# Patient Record
Sex: FEMALE | Race: WHITE | NOT HISPANIC OR LATINO | Employment: OTHER | URBAN - METROPOLITAN AREA
[De-identification: names, ages, dates, MRNs, and addresses within clinical notes are randomized per-mention and may not be internally consistent; named-entity substitution may affect disease eponyms.]

---

## 2017-02-22 ENCOUNTER — ALLSCRIPTS OFFICE VISIT (OUTPATIENT)
Dept: OTHER | Facility: OTHER | Age: 67
End: 2017-02-22

## 2017-02-22 ENCOUNTER — GENERIC CONVERSION - ENCOUNTER (OUTPATIENT)
Dept: OTHER | Facility: OTHER | Age: 67
End: 2017-02-22

## 2017-02-24 ENCOUNTER — GENERIC CONVERSION - ENCOUNTER (OUTPATIENT)
Dept: OTHER | Facility: OTHER | Age: 67
End: 2017-02-24

## 2017-03-30 ENCOUNTER — ALLSCRIPTS OFFICE VISIT (OUTPATIENT)
Dept: OTHER | Facility: OTHER | Age: 67
End: 2017-03-30

## 2017-04-20 ENCOUNTER — ALLSCRIPTS OFFICE VISIT (OUTPATIENT)
Dept: OTHER | Facility: OTHER | Age: 67
End: 2017-04-20

## 2017-04-20 DIAGNOSIS — F31.9 BIPOLAR DISORDER (HCC): ICD-10-CM

## 2017-05-12 ENCOUNTER — ALLSCRIPTS OFFICE VISIT (OUTPATIENT)
Dept: OTHER | Facility: OTHER | Age: 67
End: 2017-05-12

## 2017-05-12 ENCOUNTER — GENERIC CONVERSION - ENCOUNTER (OUTPATIENT)
Dept: OTHER | Facility: OTHER | Age: 67
End: 2017-05-12

## 2017-06-01 ENCOUNTER — ALLSCRIPTS OFFICE VISIT (OUTPATIENT)
Dept: OTHER | Facility: OTHER | Age: 67
End: 2017-06-01

## 2017-06-08 ENCOUNTER — ALLSCRIPTS OFFICE VISIT (OUTPATIENT)
Dept: OTHER | Facility: OTHER | Age: 67
End: 2017-06-08

## 2017-07-05 ENCOUNTER — ALLSCRIPTS OFFICE VISIT (OUTPATIENT)
Dept: OTHER | Facility: OTHER | Age: 67
End: 2017-07-05

## 2017-07-06 ENCOUNTER — ALLSCRIPTS OFFICE VISIT (OUTPATIENT)
Dept: OTHER | Facility: OTHER | Age: 67
End: 2017-07-06

## 2017-08-03 ENCOUNTER — ALLSCRIPTS OFFICE VISIT (OUTPATIENT)
Dept: OTHER | Facility: OTHER | Age: 67
End: 2017-08-03

## 2017-08-15 ENCOUNTER — GENERIC CONVERSION - ENCOUNTER (OUTPATIENT)
Dept: OTHER | Facility: OTHER | Age: 67
End: 2017-08-15

## 2017-08-29 ENCOUNTER — ALLSCRIPTS OFFICE VISIT (OUTPATIENT)
Dept: OTHER | Facility: OTHER | Age: 67
End: 2017-08-29

## 2017-09-07 ENCOUNTER — ALLSCRIPTS OFFICE VISIT (OUTPATIENT)
Dept: OTHER | Facility: OTHER | Age: 67
End: 2017-09-07

## 2017-09-12 ENCOUNTER — GENERIC CONVERSION - ENCOUNTER (OUTPATIENT)
Dept: OTHER | Facility: OTHER | Age: 67
End: 2017-09-12

## 2017-09-21 ENCOUNTER — GENERIC CONVERSION - ENCOUNTER (OUTPATIENT)
Dept: OTHER | Facility: OTHER | Age: 67
End: 2017-09-21

## 2017-10-05 ENCOUNTER — ALLSCRIPTS OFFICE VISIT (OUTPATIENT)
Dept: OTHER | Facility: OTHER | Age: 67
End: 2017-10-05

## 2018-01-09 NOTE — PSYCH
Provider Comments  Provider Comments:   Pt Kaden Mackay) was NO SHOW for her 3:15pm Psychotherapy lor't, so this Therapist called her---> I left her a Phone Message to please call us back to reschedule,and that I hope she is OK  -MT      Signatures   Electronically signed by : Kristen Garcia, MSAPRNPMHCNS-BC; Aug 15 2017  3:45PM EST                       (Author)

## 2018-01-10 ENCOUNTER — ALLSCRIPTS OFFICE VISIT (OUTPATIENT)
Dept: OTHER | Facility: OTHER | Age: 68
End: 2018-01-10
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
## 2018-01-10 NOTE — PSYCH
Progress Note  Psychotherapy Provided [facility]: Individual Psychotherapy 50 minutes provided today  Goals addressed in session:   Goal #1  D: " My Pain is constant, it's an 8 today ---But, I did get 3 Epidural injections this morning, into my Lower Back---I hope to get SOME decrease in Pain---Especially because my  and I are going to do a trip of a lifetime, to [employer], at the end of October, as my  is battling Stage 4 Cancer,and he feels good enough to go as soon as we can"   " I hope my Pain is tolerable, by then "   " We DID accept my daughter Edwige Bryant, (45) being discharged from [facility] AT THE [facility] Psych  unit , back to our home  Akash Gomez But I don't know how it will turn out,yet"    Akash Gomez "She did go with us on vacation to [place], [place], along with her brother, and [employer] been helping us a little, at home, since her hospital care "---"She got a new [facility] Psychiatrist,and a new Therapist, and I like them ,so far"    Akash Gomez "The doctor told her, outright, that he wasn't going to take any of her crap "    Pt Camryn Alba) has a slightly brighter mood and affect, after having been on a 4-day vacation to the beach at [place], [place], with her family  Pt denies any current SI /HI /AH/VH  Her sleep is fair,and her appetite is good  She continues meds as prescribed by her Psychiatrist, no adverse side effects  Pt processes her thoughts, feelings,and behaviors  Pt and  hesitantly accepted back home , their 45 yr old daughter, Edwige Bryant, who has Bipolar, Brain Tumor,and Seizures, & alleged Personality Disorder, after the daughter's Psych  Hospitalization  Delia Mccallum processes her thoughts, feelings,and behaviors  She is assisted with Cognitive-reframing about her strengths and how far she has come in life,so far, despite her own severe Bipolar Disorder and her Pain and multiple Medical conditions   She has been supportive of  during his Cancer treatments,and has been supportive of her daughter's treatments for Bipolar, Brain tumor,and for Seizures  Pt to do one goal per day,and give self-affirmations  She is more assertive with her daughter,and with her  Ana Harmon he is too perfectionistic")  Pt also does Deep-Breathing/ Relaxation, reads her Cindy Hull, and goes outside on her deck or her porch, when she needs a "time-out " She and  attend Family Sessions at daughter's [address] 76 care at [facility]--->pt hopes that daughter's behavior does NOT get violent or out of control again  Pt looks forward to a unique Vacation, with her , to [place] at the end of October   she has longed for this, for a very long time,and 's Cancer, plus her own medical care, plus treatments of daughter have prevented the trip in prior months  Daughter will allegedly stay at her boyfriend's, while pt and  are in [facility] / [place] [address]  Pt is given Active Listening,Support, and Affirmation during this session,also  A: Bipolar Disorder, F31 9; Generalized Anxiety Disorder, F41 10; Family Discord, Z63 9,and Multiple Medical Conditions and Chronic Pain  P: Continue Treatment Plan, Meds,and Psychotherapy  Pain Scale and Suicide Risk [facility]: Current Pain Assessment: moderate to severe   On a scale of 0 to 10, the patient rates current pain at 8   Current suicide risk is low   Behavioral Health Treatment Plan [employer]: Diagnosis and Treatment Plan explained to patient, patient relates understanding diagnosis and is agreeable to Treatment Plan  Assessment    1  Bipolar affective disorder, current episode severe (296 80) (F31 9)   2  ORION (generalized anxiety disorder) (300 02) (F41 1)   3  Family discord (V61 9) (Z63 9)   4   Chronic pain (338 29) (G89 29)    Signatures   Electronically signed by : FRANSISCO Alejandra; Aug 29 2017  8:12PM EST                       (Author)    Electronically signed by : FRANSISCO Alejandra; Aug 29 2017  8:13PM EST                       (Author)

## 2018-01-11 NOTE — PSYCH
Progress Note  Psychotherapy Provided [facility]: Individual Psychotherapy 50 minutes provided today  Goals addressed in session:   Goal #1  D: " My Pain is very bad, going down from my Left Hip and all the way down my Left Leg, that's why I can't walk well---My Pain is an 8!  " " I'm finding out from a Specialist soon, whether I need surgery or not "   " But I'm NOT having anything done, until AFTER my vacation with my  to [place] [address] on October 28---We got a Motorized Wheelchair for me there,and I am determined to enjoy it!"   " My  going through his Cancer and all, I figure life is too short to put things on-hold, at this point in my life "   " "I was very upset, seeing about those people who were killed in [facility] this Sunday night---I had to turn the News off,and try to Breathe, and do something else  '    Gurrola Ariadna "What would make a person kill people like that?"   " I'm crying because I often wonder what my OWN parents were about? I never found out a thing  [facility] I was adopted--why would they keep all 3 of my older sisters, I found out, but get rid of me?"    Pt has a variable affect,and variable mood swings  She has a moderately depressed mood today  She can be laughing one moment, and then very tearful for quite a while,afterward  She denies current SI /HI/AH/VH  She states she used to get suicidal, but she has not felt that way for quite some time , now  Her PHQ9=7, moderate to mild depression today  She processes Current Events,and her responses to them  She is emotionally -reactive to anyone's Pain and Suffering  She processes her thoughts, feelings,and behaviors  Processes Family of Origin issues, as she never found out about Mental Health or Physical health issues of her Biological parents---she was adopted-out as an infant  Pt has thought she needed her 's permission, to get info about her Birth Parents---> pt is given Psychoeducation, and some information about this   Pt may decide, after her [place] trip, to get some Health Info about her Birth Parents, (It was an Open Adoption), as her doctors always ask her about her Family History of illnesses,and she is "always crying inside that I can't tell them a thing because I don't know " Pt does express some reservation about finding out about them  She does some Cognitive -reframing, in session, about this--->"What's the worst that can happen?" Active Listening,Support,and Validation given  We look at her Survivor strengths, again---pt sees that she has many, plus she birthed 2 children, raised them, with her 's support,and she is very grateful for her 's unconditional love  Pt is assisted with Deep-breathing/Relaxation,and some Positive mantras----> pt to do this at home  Pt also expresses relief that her daughter moved out of the house, to her boyfriend's home---> there is less tension, now,and pt sees daughter often, but NOT all the time  Pt continues meds, no side effects  A: Bipolar Disorder, F31 9; Anxiety Disorder, F41 9; Family Discord re: Adult Bipolar Daughter, better, Z63 9,and pt has Diabetes and other medical conditions, including Chronic Pain  P: Continue Treatment Plan, Meds,and Psychotherapy  Pain Scale and Suicide Risk [facility]: Current Pain Assessment: moderate to severe   On a scale of 0 to 10, the patient rates current pain at 8   Current suicide risk is low   Behavioral Health Treatment Plan [facility]: Diagnosis and Treatment Plan explained to patient, patient relates understanding diagnosis and is agreeable to Treatment Plan  Results/Data  PHQ-9 Adult Depression Screening [id number] 03:15PM [facility]     Test Name Result Flag Reference   PHQ-9 Adult Depression Score 7     Over the last two weeks, how often have you been bothered by any of the following problems?   Little interest or pleasure in doing things: Not at all - 0  Feeling down, depressed, or hopeless: Not at all - 0  Trouble falling or staying asleep, or sleeping too much: More than half the days - 2  Feeling tired or having little energy: More than half the days - 2  Poor appetite or over eating: Not at all - 0  Feeling bad about yourself - or that you are a failure or have let yourself or your family down: Several days - 1  Trouble concentrating on things, such as reading the newspaper or watching television: Several days - 1  Moving or speaking so slowly that other people could have noticed  Or the opposite -  being so fidgety or restless that you have been moving around a lot more than usual: Several days - 1  Thoughts that you would be better off dead, or of hurting yourself in some way: Not at all - 0   PHQ-9 Adult Depression Screening Negative     PHQ-9 Difficulty Level Somewhat difficult     PHQ-9 Severity Mild Depression         Assessment    1  Bipolar 1 disorder (296 7) (F31 9)   2  ORION (generalized anxiety disorder) (300 02) (F41 1)   3   Family discord (V61 9) (Z63 9)    Signatures   Electronically signed by : BASHIR RendonMHCNS-BC; Oct  5 2017 10:27PM EST                       (Author)    Electronically signed by : FRANSISCO Rendon; Oct  5 2017 10:27PM EST                       (Author)

## 2018-01-11 NOTE — PSYCH
Progress Note  Psychotherapy Provided [facility]: Individual Psychotherapy 50 minutes provided today  Goals addressed in session:   Goal #1  D: " I had a scare on Tuesday---My blood sugar was up to 477, and I was feeling very jittery, weak, and 'not myself'---so my  drove me over to ER at [facility]  Akash Gomez The ER doctors even checked me for a possible stroke, because my Right side of my face droops down---But I was OK, no stroke,and they gave me IV's , and my usual Insulin  Akash Gomez I got to go home the same day "   " The ER doctors said my high blood sugar episode could have been related to Steroid treatments I get for my Back Pain, and my Right Facial drooping can be residual from my Bell's Palsy years ago, so I am relieved!"    Pt Roland Freeman) was the only pt who arrived today for Bipolar Group, (others are sick, apparently), so she agreed to have an Individual Therapy session today  She has an anxious, hyperverbal affect  Mood is just slightly depressed  She denies any SI / HI / Franki Libel / VH  She has had no hypomanic behaviors, no screaming "meltdowns" lately,and feels good about her Coping activities and Anger-Management skills lately  She gets about 5 hours Sleep per night---an improvement over the 2 or 3 hrs she used to get  Her appetite is good---she has been decreasing her portion sizes lately ,as she is intent on losing a few lbs before her [employer] [place] trip with  October 29  Pt processes her recent stress re: her sudden Blood Glucose elevation to 477 , on Tuesday ( 9/5/17)---She tried to remain calm, she asked her  to take her to the ER, she did her Calm / Deep-breathing exercises, she "didn't burst into tears" as she says she would have a couple years ago, and told herself she would be OK---> she got through the ER / diagnostics/ IV's, fine,and she has been maintaining normal Bl  sugars since then   Pt does Cognitive-reframing, in session today,and she is able to give Self-affirmations for handling the situation well  She follows her diabetic diet, drinks adequate water,and the ER doctors reassured her that the possible side effects of her recent Pain Management corticosteroid injections, should subside soon  Pt is practicing slow, calm, Deep-breathing/ Mindfulness exercises at home, she does household chores, she goes outside on her deck, enjoys [facility], and she appreciates positives in her life  She looks forward to her friends' visiting her and  Sept 30 , for a couple days,and she looks forward to her vacation with  and with her adult son, to [facility] on October 29  Her daughter, who has Bipolar Disorder and Brain tumor, has been behaving much better lately, (since hospitalization and ongoing psychotherapy,) and pt is thankful that her daughter spends the bulk of her time with her boyfriend , now  Pt continues Psych meds as prescribed by her physician  A: Bipolar Disorder, F31 9; Generalized Anxiety Disorder, F41 9; Family discord, improving, F41 9; and pt has diabetes, chronic pain,and other medical conditions  P: Continue Treatment Plan, Meds, Psychotherapy,and Positive Coping behaviors  Pain Scale and Suicide Risk [facility]: Current Pain Assessment: moderate to severe   On a scale of 0 to 10, the patient rates current pain at 5   Current suicide risk is low   Behavioral Health Treatment Plan [address] [place] [address] [address]: Diagnosis and Treatment Plan explained to patient, patient relates understanding diagnosis and is agreeable to Treatment Plan  Assessment    1  Bipolar 1 disorder (296 7) (F31 9)   2  ORION (generalized anxiety disorder) (300 02) (F41 1)   3   Family discord (V61 9) (Z63 9)    Signatures   Electronically signed by : Azra Wagoner MSAPRNPMHCNS-BC; Sep  7 2017  4:07PM EST                       (Author)
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
## 2018-01-12 NOTE — PSYCH
Psych Med Mgmt    Appearance: was calm and cooperative  Observed mood: mood appropriate  Observed mood: affect appropriate  Speech: a normal rate  Thought processes: coherent/organized  Hallucinations: no hallucinations present  Thought Content: no delusions  Abnormal Thoughts: The patient has no suicidal thoughts and no homicidal thoughts  Orientation: The patient is oriented to person, place and time  Recent and Remote Memory: short term memory intact and long term memory intact  Attention Span And Concentration: concentration intact  Insight: Insight intact  Judgment: Her judgment was intact  Treatment Recommendations: Depakote level, hepatic function pane    D/C ambien    Abilify 5 mg po qhs  Trazodone 100 mg 1/2 -1 po qhs prn  Depakote 500 mg po bid    Risks, Benefits And Possible Side Effects Of Medications: Risks, benefits, and possible side effects of medications explained to patient and patient verbalizes understanding  She reports normal appetite, normal energy level, no weight change and decrease in number of sleep hours   Pt states she is feeling better- :calmer"  She cannot sleep though- she falls asleep okay with ambien but awakens after 2-3 hours  She states she has increase worries after she awakens- "think about anything and everything"  She states her  was started in [address] and he has been much nicer and supportive  Vitals  Signs [Data Includes: Current Encounter]   Recorded: 14KFN3847 03:30PM   Height: 5 ft 4 in  Weight: 189 lb   BMI Calculated: 32 44  BSA Calculated: 1 91    DSM    Provisional Diagnosis: Bipolar Disorder  Assessment    1  Bipolar affective disorder, current episode severe (296 80) (F31 9)    Plan    1  Zolpidem Tartrate 5 MG Oral Tablet (Ambien)   2  TraZODone HCl - 100 MG Oral Tablet; 1/2 - 1 po qhs   3  ARIPiprazole 5 MG Oral Tablet (Abilify); 1 tab po qhs   4   Divalproex Sodium 500 MG Oral Tablet Delayed Release (Depakote); 1 tab po   bid   5  (1) HEPATIC FUNCTION PANEL; Status:Active; Requested for:22Jan2016;    6  (1) VALPROIC ACID (DEPAKOTE); Status:Active; Requested for:22Jan2016;     Review of Systems    Constitutional: No fever, no chills, feels well, no tiredness, no recent weight gain or loss  Cardiovascular: no complaints of slow or fast heart rate, no chest pain, no palpitations  Respiratory: no complaints of shortness of breath, no wheezing, no dyspnea on exertion  Gastrointestinal: no complaints of abdominal pain, no constipation, no nausea, no diarrhea, no vomiting  Genitourinary: no complaints of dysuria, no incontinence, no pelvic pain, no urinary frequency  Musculoskeletal: back pain  Active Problems    1  Bipolar affective disorder, current episode severe (296 80) (F31 9)    Allergies    1  Percocet TABS   2  Sulfa Drugs    Current Meds   1  ARIPiprazole 5 MG Oral Tablet; 1 tab po qhs;   Therapy: 97HSI2344 to (Last Rx:09Nov2015)  Requested for: 98RLK0105 Ordered   2  Divalproex Sodium 500 MG Oral Tablet Delayed Release; 1 tab po bid  Requested for:   23OOX2145; Last Rx:09Nov2015 Ordered   3  Lantus SOLN;   Therapy: (Recorded:09Nov2015) to Recorded   4  MetFORMIN HCl TABS; Therapy: ([phone]) to Recorded   5  Robaxin-750 TABS; Therapy: (679 738 973) to Recorded   6  Vicodin TABS; Therapy: (679 738 973) to Recorded   7  Zolpidem Tartrate 5 MG Oral Tablet; take 1 tablet at bedtime as needed; Therapy: 72NLJ4513 to (Last MQ:26REC8000) Ordered    The medication list was reviewed and updated today  Family Psych History    1  No pertinent family history    2  Adopted (D72 92) (Z02 82)    The family history was reviewed and updated today  Social History    · Never a smoker   · Social alcohol use (F10 99)  The social history was reviewed and updated today  The social history was reviewed and is unchanged  End of Encounter Meds    1   ARIPiprazole 5 MG Oral Tablet (Abilify); 1 tab po qhs;   Therapy: 45RNC2736 to (Last Rx:22Jan2016)  Requested for: 77XRV2037 Ordered   2  Divalproex Sodium 500 MG Oral Tablet Delayed Release (Depakote); 1 tab po bid    Requested for: 10CXO3116; Last Rx:22Jan2016 Ordered   3  TraZODone HCl - 100 MG Oral Tablet; 1/2 - 1 po qhs;   Therapy: 80HWE5791 to (Last Rx:22Jan2016) Ordered    4  Lantus SOLN;   Therapy: (Recorded:09Nov2015) to Recorded   5  MetFORMIN HCl TABS; Therapy: ([phone]) to Recorded   6  Robaxin-750 TABS (Methocarbamol); Therapy: (669 638 953) to Recorded   7  Vicodin TABS;    Therapy: (526 244 953) to Recorded    Signatures   Electronically signed by : Mariama Harvey, [facility]; Jan 22 2016  3:36PM EST                       (Author)    Electronically signed by : Genesis Herbert MD; Jan 26 2016  4:30PM EST

## 2018-01-13 NOTE — PSYCH
Psych Med Mgmt    Appearance: was calm and cooperative, adequate hygiene and grooming and good eye contact  Observed mood: anxious  Speech:  s/w rambling  Thought processes: coherent/organized  Hallucinations: no hallucinations present  Thought Content: no delusions  Abnormal Thoughts: The patient has no suicidal thoughts and no homicidal thoughts  Orientation: The patient is oriented to person, place and time  Recent and Remote Memory: short term memory intact and long term memory intact  Attention Span And Concentration: concentration intact  Insight: Insight intact  Judgment: Her judgment was intact  Muscle Strength And Tone  Normal gait and station  Treatment Recommendations: Trazodone 100 1 and 1/2 po qhs  Abilify 10 mg po qhs  Neurontin 100 mg po bid and increase to 400 mg po qhs  Change Depakote to ER 1500 mg po qhs- get lab work    Tremor has been ongoing prior to Abilify and on left side only- question etiology    She is seeing Fran Quintanilla for therapy and this is going well  Risks, Benefits And Possible Side Effects Of Medications: Risks, benefits, and possible side effects of medications explained to patient and patient verbalizes understanding  She reports normal appetite, normal energy level, no weight change and decrease in number of sleep hours   Pt seen for follow up accompanied by her spouse  Pt state she feels "shaky on the inside"  She complains of racing thoughts- she states "things pop in her mind and keep on going"  She complains of decreased sleep due to this  She does not some "shakiness" on the left side which has been ongoing issues  She       Vitals  Signs   Recorded: 62JVM8529 02:20PM   Weight: 184 lb   BMI Calculated: 31 58  BSA Calculated: 1 89  Recorded: 61TOR1305 02:11PM   Respiration: 16  Height: 5 ft 4 in    DSM    Provisional Diagnosis: Bipolar Disorder  Assessment    1   Bipolar affective disorder, current episode severe (296 80) (F31 9)   2  Anxiety disorder (300 00) (F41 9)    Plan    1  Divalproex Sodium 500 MG Oral Tablet Delayed Release (Depakote)   2  Divalproex Sodium  MG Oral Tablet Extended Release 24 Hour (Depakote   ER); take 3 tablets by mouth at bedtime   3  ARIPiprazole 10 MG Oral Tablet; 1 po qhs   4  Gabapentin 100 MG Oral Capsule; 1 cap po bid and 4 po qhs   5  (1) VALPROIC ACID (DEPAKOTE); Status:Active; Requested for:47Onc6139;     Review of Systems    Constitutional: No fever, no chills, feels well, no tiredness, no recent weight gain or loss  Cardiovascular: no complaints of slow or fast heart rate, no chest pain, no palpitations  Active Problems    1  Anticipatory grieving (309 0) (F43 20)   2  Anxiety disorder (300 00) (F41 9)   3  Bipolar affective disorder, current episode severe (296 80) (F31 9)   4  Chronic pain (338 29) (G89 29)   5  Marital conflict (M35 17) (I05 4)    Allergies    1  Percocet TABS   2  Sulfa Drugs    Current Meds   1  ARIPiprazole 10 MG Oral Tablet; 1 po qhs;   Therapy: 89ENO7896 to (Last Rx:63Foz8392)  Requested for: 78Cpq6413 Ordered   2  Cozaar 50 MG Oral Tablet; Therapy: (Recorded:02Dfq6437) to Recorded   3  Divalproex Sodium 500 MG Oral Tablet Delayed Release; 1 tab po qam and 2 po qhs    Requested for: 61UUD2925; Last Rx:22Mar2017 Ordered   4  Gabapentin 100 MG Oral Capsule; 1 cap po bid and 2 po qhs;   Therapy: 20Apr2017 to (Last Rx:20Apr2017)  Requested for: 20Apr2017 Ordered   5  Lantus SOLN;   Therapy: (Recorded:09Nov2015) to Recorded   6  MetFORMIN HCl TABS; Therapy: (0472 51 11 42) to Recorded   7  TraZODone HCl - 100 MG Oral Tablet; 1 and 1/2 po qhs;   Therapy: 27Apr2016 to (Last Rx:20Apr2017)  Requested for: 20Apr2017 Ordered    The medication list was reviewed and updated today  Family Psych History  Mother    1  No pertinent family history  Family History    2  Adopted (S25 21) (Z02 82)    The family history was reviewed and updated today         Social History    · Marital conflict (K39 00) (E34 5)   · Never a smoker   · Social alcohol use (Z78 9)  The social history was reviewed and is unchanged  End of Encounter Meds    1  ARIPiprazole 10 MG Oral Tablet; 1 po qhs;   Therapy: 70GYP7636 to (Last Rx:29Yyk8460)  Requested for: 16DRJ0602; Status: ACTIVE -   Transmit to Pharmacy - Awaiting Verification Ordered   2  Divalproex Sodium  MG Oral Tablet Extended Release 24 Hour (Depakote ER);   take 3 tablets by mouth at bedtime; Therapy: 21MJZ1887 to (Last Rx:33Qcb2955)  Requested for: 61XOP5160; Status: ACTIVE -   Transmit to Pharmacy - Awaiting Verification Ordered   3  Gabapentin 100 MG Oral Capsule; 1 cap po bid and 4 po qhs;   Therapy: 11Kha0503 to (Last Rx:87Eup0027)  Requested for: 94VQY3842; Status: ACTIVE -   Transmit to Pharmacy - Awaiting Verification Ordered   4  TraZODone HCl - 100 MG Oral Tablet; 1 and 1/2 po qhs;   Therapy: 72Qca9265 to (Last Rx:09Tye0617)  Requested for: 81Ozd6341 Ordered    5  Cozaar 50 MG Oral Tablet (Losartan Potassium); Therapy: (Recorded:75Usd1833) to Recorded   6  Lantus SOLN;   Therapy: (Recorded:61Guj0685) to Recorded   7  MetFORMIN HCl TABS;    Therapy: (Recorded:09Nov2015) to Recorded    Future Appointments    Date/Time Provider Specialty Site   07/06/2017 02:15 PM Azul Villanueva MS, APRN, PMHCNS_BS  [facility]   07/20/2017 02:15 PM Jeremiah Lopez MS, APRN, [facility]  [facility]   Electronically signed by : Precious Henriquez, [facility]; Jul 5 2017  2:36PM EST                       (Author)    Electronically signed by : Murali Sanchez MD; Jul 5 2017  4:54PM EST

## 2018-01-13 NOTE — PSYCH
Provider Comments  Provider Comments:   NO SHOW for her 3:15pm Psychotherapy lor't, so this Therapist called pt and asked her (left a voice message), to please reschedule soon  -MT      Signatures   Electronically signed by : BASHIR CarcamoMHCNSAVANAH; Sep 12 2017  3:41PM EST                       (Author)

## 2018-01-13 NOTE — PSYCH
Psych Med Mgmt    Appearance: was calm and cooperative  Observed mood: mood appropriate  Observed mood: affect appropriate  Speech: a normal rate  Thought processes: coherent/organized  Hallucinations: no hallucinations present  Thought Content: no delusions  Abnormal Thoughts: The patient has no suicidal thoughts and no homicidal thoughts  Orientation: The patient is oriented to person, place and time  Recent and Remote Memory: short term memory intact and long term memory intact  Attention Span And Concentration: concentration intact  Insight: Insight intact  Judgment: Her judgment was intact  Treatment Recommendations: Abilify 5 mgpo qhs  Hydoxyzine 50 mg po q 8 hours prn anxiety  Depakote 500 mgpo bid  Check Depakote level  Risks, Benefits And Possible Side Effects Of Medications: Risks, benefits, and possible side effects of medications explained to patient and patient verbalizes understanding  She reports normal appetite, normal energy level, no weight change and decrease in number of sleep hours   Pt seen for follow up Bipolar DIsorder  She had back surgery in June and this is feeling better- occasional back spasms  She unfortunately now has hip pain and had bone scan done to rule out osteoporosis  She states difficult with sleep due to hip pain  She states she is sleeping a little better with 1 and 1/2 trazodone which has been helping  She is going to physical therapy for hips/ back  She states she has been having episodes a couple times a week where she feels like she a "weight on her chest"- lasts about 15 minutes and has been associated with anxiety  Vitals  Signs   Recorded: 69HRY7667 65:38PR   Systolic: 137  Diastolic: 64  Heart Rate: 64  Respiration: 16  Recorded: 19KOC7871 04:19PM   Height: 5 ft 4 in  Weight: 176 lb   BMI Calculated: 30 21  BSA Calculated: 1 85    DSM    Provisional Diagnosis: Bipolar Disorder  Assessment    1   Bipolar affective disorder, current episode severe (296 80) (F31 9)    Plan    1  HydrOXYzine HCl - 50 MG Oral Tablet; 1 po q 8 hours anxiety   2  ARIPiprazole 5 MG Oral Tablet (Abilify); 1 po qhs   3  Divalproex Sodium 500 MG Oral Tablet Delayed Release (Depakote); 1 tab po   bid   4  TraZODone HCl - 100 MG Oral Tablet; 1 and 1/2 po qhs   5  (Q) VALPROIC ACID; Status:Active; Requested for:79Eih2913;     Review of Systems    Constitutional: No fever, no chills, feels well, no tiredness, no recent weight gain or loss  Cardiovascular: no complaints of slow or fast heart rate, no chest pain, no palpitations  Respiratory: no complaints of shortness of breath, no wheezing, no dyspnea on exertion  Gastrointestinal: no complaints of abdominal pain, no constipation, no nausea, no diarrhea, no vomiting  Genitourinary: no complaints of dysuria, no incontinence, no pelvic pain, no urinary frequency  Musculoskeletal: hip pain  Integumentary: no complaints of skin rash, no itching, no dry skin  Neurological: no complaints of headache, no confusion, no numbness, no dizziness  Active Problems    1  Bipolar affective disorder, current episode severe (296 80) (F31 9)    Allergies    1  Percocet TABS   2  Sulfa Drugs    Current Meds   1  ARIPiprazole 5 MG Oral Tablet; 1 tab po qhs;   Therapy: 07KRB7243 to (Last Rx:22Jan2016)  Requested for: 88RYZ5028 Ordered   2  Divalproex Sodium 500 MG Oral Tablet Delayed Release; 1 tab po bid  Requested for:   35OCC7303; Last Rx:22Jan2016 Ordered   3  Lantus SOLN;   Therapy: (Recorded:09Nov2015) to Recorded   4  MetFORMIN HCl TABS; Therapy: (0498 72 13 49) to Recorded   5  Oxybutynin Chloride 5 MG Oral Tablet; Therapy: (Yuly High) to Recorded   6  TraMADol HCl - 50 MG Oral Tablet; Therapy: (Yuly High) to Recorded   7  TraZODone HCl - 100 MG Oral Tablet; 1 and 1/2 po qhs;   Therapy: 27Apr2016 to (Last Rx:55Oaz2392)  Requested for: 85Pjf2395 Ordered   8   Vicodin TABS; Therapy: (0893-1982282) to Recorded    The medication list was reviewed and updated today  Family Psych History  Mother    1  No pertinent family history  Family History    2  Adopted (T31 42) (Z02 82)    The family history was reviewed and updated today  Social History    · Never a smoker   · Social alcohol use (Z78 9)  The social history was reviewed and updated today  The social history was reviewed and is unchanged  End of Encounter Meds    1  ARIPiprazole 5 MG Oral Tablet (Abilify); 1 po qhs;   Therapy: 75DQI5400 to (Last Rx:44Gbq6295)  Requested for: 87Ffk1706 Ordered   2  Divalproex Sodium 500 MG Oral Tablet Delayed Release (Depakote); 1 tab po bid    Requested for: 52Nbp3707; Last Rx:08Enh5970 Ordered   3  HydrOXYzine HCl - 50 MG Oral Tablet; 1 po q 8 hours anxiety; Therapy: 70Fhm2615 to (Last Rx:15Dez9800)  Requested for: 11Flf6258 Ordered   4  TraZODone HCl - 100 MG Oral Tablet; 1 and 1/2 po qhs;   Therapy: 89Amm5809 to (Last Rx:85Nom1519)  Requested for: 87Bcw4964 Ordered    5  Lantus SOLN;   Therapy: (Recorded:09Nov2015) to Recorded   6  MetFORMIN HCl TABS; Therapy: ([id number]) to Recorded   7  Oxybutynin Chloride 5 MG Oral Tablet; Therapy: (Pablo Sutherland) to Recorded   8  TraMADol HCl - 50 MG Oral Tablet; Therapy: (Pablo Sutherland) to Recorded   9  Vicodin TABS;    Therapy: ([id number]) to Recorded    Signatures   Electronically signed by : Janet Siddiqui, [facility]; Sep 14 2016  4:35PM EST                       (Author)    Electronically signed by : Marianela Soria MD; Sep 14 2016  4:47PM EST

## 2018-01-15 NOTE — PSYCH
Progress Note  Psychotherapy Provided [facility]: Individual Psychotherapy 30 minutes provided today  Goals addressed in session:   Goal #1  D: Pt Keshav Damon) was one of 2 pts  who arrived for Bipolar Wellness Group today, so each pt was given 30" of Psychotherapy,and then the final segment was comprised of Deep-Breathing/ Relaxation/ Guided Imagery/ Mindfulness, accompanied by [employer] via CD  Sylvia Broussard had an anxious , depressed affect  Her mood has been more depressed lately, and she reported no signs of hypomania lately  Denied Maira Rebollar Speaker [facility] Sumanth Heck [address]  She identified her current severe Stressors: her 's Stage 4 Cancer, her daughter's mood-swings/ angry outbursts at Sylvia Broussard, and the fact that after her daughter had a conflict with pt this week, daughter was screaming at pt,and pt decided she wasn't going to back-down from the daughter's aggression , (conflict was over a seemingly brief negative interaction that the daughter had with her brother, who lives at pt's house also, and who works & helps pay the bills for pt and  while  is not working due to cancer)----and pt's daughter impulsively decided to take an O/D of pills  Pt called 911, when daughter''s boyfriend called pt and disclosed to her that the daughter had called him and told him she O/D'd, in her bedroom, behind locked door--->Daughter has now been admitted to a psych unit, pt reports  Pt Keshav Damon) states she is always trembling inside herself, wondering, in fear, WHAT her daughter will do next? Her  somehow gallo with the daughter's behavior, so pt Keshav Damon) feels limited social support from him  Sylvia Broussard says her daughter has both Psych problems And Medical issues (brain tumor), so she has been very difficult to treat  Sylvia Broussard is assisted with Cognitive-reframing about herself and her own strengths, in session   She is encouraged, in session, to talk to the (s) where her daughter is hospitalized ,and impress upon them that pt's health and that of her , cannot take the stress of the adult daughter living at home with them any further  Pt Melvi Parsons) has Hypertension, Diabetes, Chronic pain,and several other conditions,and her  is having chemotherapy for Stage 4 cancer, and he is not feeling well much of the time  Tien Ruiz said her blood glucose was over 450, after the episode with her daughter---she contacted her doctor, and she took her diabetic meds in the prescribed emergency doses,and her glucose came down by today, she reported  Tien Ruiz was also given Active Listening, Support,and Validation, in session  She said she felt more relaxed after speaking and after receiving support today  She felt more relaxed after the Deep-breathing/ Relaxation segment  She continues meds as prescribed by Dr Saintclair Burnet  A: Bipolar Disorder, F31 9; Generalized Anxiety Disorder, F41 10; Family Discord; Chronic Pain, Diabetes mellitus ,and other conditions  P: Continue Meds, Treatment Plan,and Group Therapy, and Individual Psychotherapy  It is not recommended that pt and  continue to have their acting-out adult daughter back at home with them---Yee to give assertive input to her daughter's Psychiatric Team        Pain Scale and Suicide Risk [facility]: Current Pain Assessment: moderate to severe   On a scale of 0 to 10, the patient rates current pain at 5   Current suicide risk is low   Behavioral Health Treatment Plan [facility]: Diagnosis and Treatment Plan explained to patient, patient relates understanding diagnosis and is agreeable to Treatment Plan  Assessment    1  Bipolar affective disorder, current episode severe (296 80) (F31 9)   2  ORION (generalized anxiety disorder) (300 02) (F41 1)   3  Marital conflict (D48 32) (Z81 7)   4  Chronic pain (338 29) (G89 29)   5   Family discord (V61 9) (Z63 9)    Signatures   Electronically signed by : Pamela Aguila MSAPRNPMHCNS-BC; Aug  3 2017  6:27PM EST                       (Author) Electronically signed by : Magalys Xie MSAPRNPMHCNS-BC; Aug  3 2017  6:27PM EST                       (Author)

## 2018-01-15 NOTE — PSYCH
Progress Note  Psychotherapy Provided  [facility]: Group Therapy provided today  Goals addressed in session:   Goal #1  D: Laurence Sosa was one of 3 clients who participated in today's " Bipolar Wellness " Group  Topics explored today included: Introductions; Confidentiality; Group Members' Sharing of their current status/ Mood patterns and their current Stressors; [place] Support among Group members; Some Psychoeducation provided by this Therapist regarding Bipolar Disorder + Anxiety; Cognitive-reframing from all-negative thoughts or constant worry---> toward more realistic thoughts, and the value of doing One Goal per Day and members' giving at least one Self-Affirmation each day; Group members' Sharing of their own Self-Affirmation today; and the last segment of today's session was comprised of Deep-Breathing/ Relaxation/ Guided Imagery accompanied by [employer] via CD  This was Yee's first Group,and she shared that she was very Anxious prior to Group, but she pushed herself to come today, it was her personal Accomplished Goal today,and she gave self-affirmation  Other peers gave her affirmation, also  She stated she felt comfortable with the people here today  She shared that she has been struggling with Bipolar Disorder for over 20 years  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, (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), for over 8 years, plus did other jobs afterward, until her symptoms prevented her from working several years ago, and she went on [address]  [employer]  She also shared that her  and family are having a [address] Party for her at her yard, on July 14, for which she is very thankful and pleased  She offered Support and HOPE to other Group Members today, and they gave her "[address] [place] [address] Avenue and support,also  She had no evidence of SI /HI /AH/VH at Group today    A: Bipolar Disorder, F31 9; Generalized Anxiety Disorder, F41 10,and other medical conditions  Pt appears to benefit from Group Therapy  P: Continue Treatment Plan, Meds as prescribed by physicians, Individual Psychotherapy and Group Therapy  Pain Scale and Suicide Risk [facility]: Current Pain Assessment: moderate to severe   On a scale of 0 to 10, the patient rates current pain at 2   Current suicide risk is low   Behavioral Health Treatment Plan [facility]: Diagnosis and Treatment Plan explained to patient, patient relates understanding diagnosis and is agreeable to Treatment Plan  Assessment    1  Bipolar affective disorder, current episode severe (296 80) (F31 9)   2  ORION (generalized anxiety disorder) (300 02) (F41 1)   3   Chronic pain (338 29) (G89 29)    Signatures   Electronically signed by : Any Marie MSAPRNPMHCNS-BC; Jul 6 2017  4:16PM EST                       (Author)

## 2018-01-15 NOTE — PSYCH
Provider Comments  Provider Comments:   Pt apparently was a NO SHOW for Bipolar Wellness Group today, 2:15pm, so this Therapist called her and left her a voice message to please notify us of her intent to stop Group attendance, if that is her decision,and just continue with her Individual Psychotherapy?  (She will be removed from Group roster, if we do not hear back from her  )-MT      Signatures   Electronically signed by : Donny Simmons MSAPRNPMHCNSAVANAH; Sep 21 2017  3:47PM EST                       (Author)

## 2018-01-15 NOTE — PSYCH
Psych Med Mgmt    Appearance: was calm and cooperative  Observed mood: mood appropriate  Observed mood: affect appropriate  Speech: a normal rate  Thought processes: coherent/organized  Hallucinations: no hallucinations present  Thought Content: no delusions  Treatment Recommendations: Abilify 5 mg po qhs  Depakote 500 mg po bid  Trazodone 100 mg 1/2 -1 po qhs  Risks, Benefits And Possible Side Effects Of Medications: Risks, benefits, and possible side effects of medications explained to patient and patient verbalizes understanding  She reports normal appetite, normal energy level, recent lbs weight loss and decrease in number of sleep hours   Pt states she is feeling "pretty good" during the day  Moods are much improved though and she is feeling more motivate and less anxious, less depressed  SHe continues with poor sleep- states she falls asleep and then awakens after 3-4 hours- states she is thinking about things at night  Vitals  Signs [Data Includes: Current Encounter]   Recorded: 27Apr2016 03:30PM   Heart Rate: 69  Respiration: 16  Systolic: 503  Diastolic: 72  Recorded: 35PLZ4847 03:15PM   Height: 5 ft 4 in  Weight: 185 lb   BMI Calculated: 31 76  BSA Calculated: 1 89    DSM    Provisional Diagnosis: Bipolar Disorder  Assessment    1  Bipolar affective disorder, current episode severe (296 80) (F31 9)    Plan    1  TraZODone HCl - 100 MG Oral Tablet; 1 and 1/2 po qhs   2  (1) HEPATIC FUNCTION PANEL; Status:Active; Requested for:27Apr2016;    3  (1) VALPROIC ACID (DEPAKOTE); Status:Active; Requested for:27Apr2016;     Review of Systems    Constitutional: No fever, no chills, feels well, no tiredness, no recent weight gain or loss  Cardiovascular: no complaints of slow or fast heart rate, no chest pain, no palpitations  Respiratory: no complaints of shortness of breath, no wheezing, no dyspnea on exertion     Gastrointestinal: no complaints of abdominal pain, no constipation, no nausea, no diarrhea, no vomiting  Genitourinary: incontinence  Musculoskeletal: back pain  Integumentary: no complaints of skin rash, no itching, no dry skin  Active Problems    1  Bipolar affective disorder, current episode severe (296 80) (F31 9)    Allergies    1  Percocet TABS   2  Sulfa Drugs    Current Meds   1  ARIPiprazole 5 MG Oral Tablet; 1 tab po qhs;   Therapy: [id number] to (Last Rx:22Jan2016)  Requested for: 31SNF5225 Ordered   2  Divalproex Sodium 500 MG Oral Tablet Delayed Release; 1 tab po bid  Requested for:   62CSR1024; Last Rx:22Jan2016 Ordered   3  Lantus SOLN;   Therapy: (Recorded:09Nov2015) to Recorded   4  MetFORMIN HCl TABS; Therapy: ([id number]) to Recorded   5  Oxybutynin Chloride 5 MG Oral Tablet; Therapy: (Pablo Sutherland) to Recorded   6  TraMADol HCl - 50 MG Oral Tablet; Therapy: (Pablo Sutherland) to Recorded   7  Vicodin TABS; Therapy: ([id number]) to Recorded    The medication list was reviewed and updated today  Family Psych History  Mother    1  No pertinent family history  Family History    2  Adopted (L10 79) (Z02 82)    The family history was reviewed and updated today  Social History    · Never a smoker   · Social alcohol use (Z78 9)  The social history was reviewed and updated today  The social history was reviewed and is unchanged  End of Encounter Meds    1  ARIPiprazole 5 MG Oral Tablet (Abilify); 1 tab po qhs;   Therapy: [id number] to (Last Rx:22Jan2016)  Requested for: 78SJS2278 Ordered   2  Divalproex Sodium 500 MG Oral Tablet Delayed Release (Depakote); 1 tab po bid    Requested for: [id number]; Last Rx:22Jan2016 Ordered   3  TraZODone HCl - 100 MG Oral Tablet; 1 and 1/2 po qhs;   Therapy: 27Apr2016 to (Last Rx:27Apr2016)  Requested for: 27Apr2016 Ordered    4  Lantus SOLN;   Therapy: (Recorded:09Nov2015) to Recorded   5  MetFORMIN HCl TABS; Therapy: ([id number]) to Recorded   6  Oxybutynin Chloride 5 MG Oral Tablet; Therapy: (Hossein Cat) to Recorded   7  TraMADol HCl - 50 MG Oral Tablet; Therapy: (Hossein Cat) to Recorded   8  Vicodin TABS; Therapy: ([phone]) to Recorded    Signatures   Electronically signed by : Richelle Nunn [facility];  Apr 27 2016  3:32PM EST                       (Author)    Electronically signed by : Manoj Noyola MD; Apr 27 2016  4:20PM EST
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
## 2018-01-16 NOTE — PSYCH
Behavioral Health Outpatient Intake    Referred By: PT OF DR DOMINICK CARRERO  Intake Questions: there are no developmental disabilities  the patient does not have a hearing impairment  the patient does not have an ICM or CTT  patient is not taking injectable psychiatric medications  Employment: The patient is not employed  Emergency Contact Information:   Emergency Contact: Wallie Bumpers   Relationship to Patient: MIKHAIL   Phone Number: [phone]   Previous Psychiatric Treatment: She has previously been seen by a psychiatrist  Michael Streeter  She has previously been seen by a therapist  8-10 YRS AGO   History: no  service  She has not had combat service  Insurance Subscriber: Wallie Bumpers   : 10/21/1947   Address: [facility]   Employer: [employer]   Primary Insurance: [employer]   ID number: [id number]   Group number: [id number]   Secondary Insurance: MEDICARE   ID number: [id number]         Presenting Problem (in patient's words): ALOT OF STRESS,  Substance Abuse: NONE  Previous Treatment: The patient has not been seen here in the past      Accepted as Patient   Modesto Armando 3/30/17 3:15PM     Primary Care Physician: DR Sheron Lawler   Electronically signed by : Aditya Hsu, ; 2017  3:43PM EST                       (Author)

## 2018-01-16 NOTE — MISCELLANEOUS
Message   Recorded as Task   Date: 03/21/2017 01:08 PM, Created By: Ryan Jacobs   Task Name: Call Back   Assigned To:  Magali Craven   Regarding Patient: Nehemias Howe, Status: Active   Ashish Lopez - 21 Mar 2017 1:08 PM     TASK CREATED  Caller: Self; [phone] (Home)  feels funny like she is jumping out of her skin patient aware you will call her on wednesday   Pt complains of continues anxiety/ variable sleep- depakote level was low -38 on 2/24/17- increase depakote to 500mg qam and 100 mg qhs      Plan  Bipolar affective disorder, current episode severe    · Divalproex Sodium 500 MG Oral Tablet Delayed Release (Depakote); 1 tab po  qam and 2 po qhs    Signatures   Electronically signed by : KRISSY Hough; Mar 22 2017 10:34AM EST                       (Author)

## 2018-01-16 NOTE — PSYCH
Progress Note  Psychotherapy Provided [facility]: Initial Evaluation provided today  Goals addressed in session:   {This Form is used today, because the Keep Me Certified software program keeps going down repeatedly today  }    D: " I have so much stress, that I can't even think "   " My  Zenaida Taylor (here with patient), has Prostate Cancer and it spread to his bones; my daughter Pina Hooper, 40, has Cancer and a Brain Tumor---she lives with us but she makes my life miserable, she's Bipolar but she doesn't respond that well to treatment; my son Danish Geronimo, 43, has a job, lives with us,and he does help with the bills, he has no wife or family; and I have very bad Bipolar disorder, and Anxiety, for many, many years now---plus,I have Pain all the time---My pain is an 8, in my Back, right now "  Stephany Abdi "Plus, I'm worried about my eyesight, I have 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 Right Eye, 'Cateketosis or something',and I see the eye doctor Dr Mora Melo for that"   " Dr Cecily Carpenter and the Physician Assistant Jignesh Doty take care of my Bipolar, for years  "   ' I'm afraid my  is going to die,and then what will I do?"   " Fidelia Skelton been  52 years   " (tearful as she identifies the potential loss of  due to his Cancer  )  Stephany Abdi This is the first Outpatient Psychotherapy session for this 73 yr old w   female who is referred by Dr Bruno Lizarraga office  (See his documents, for background ) Pt and  come to this session together,as they are both involved in pt's immediate and severe stressors right now  Pt has a reported long history of Bipolar Disorder, past suicide attempt years ago via O/D's, past Psych  hospitalizations at [facility] , and pt has several Medical and Pain conditions, including Diabetes Mellitus, Arthritis, Pain, Spinal Stenosis, and decreased vision in her Right Eye,and other issues  Pt's  Simone Aguirre, offers info re: his Metastasized Prostate Cancer, (mets   to Bone),and his Chemotherapy and Oncology medical care  He is off work on [address], , for 3 months now, but he wants to return to work in the future,and he has a very positive attitude about possibly beating the cancer  He enjoys work,(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[employer]) , and he enjoys interaction with people, whereas his wife is home most of the time, she is very emotionally needy, very somatically preoccupied   gets more positive stimuli and interaction at work than at home, so he is in no mood to retire  Pt would like him to retire, however, "to be with me "    Pt's interests are Cooking, Trying new recipes from the Internet, taking photos of the foods/ platters she prepares, and Facebook and [employer] interactions with internet "friends "    Pt's Mental Status exam today includes the following: She is alert and oriented x3  She has an anxious, depressed , tearful affect  Her mood is variable, with past angry outbursts, but now it is mostly apparently depressed  Pt 's speech is overinclusive and she has flight of ideas  She denies current SI /HI [address]  But pt does get Passive Death Thoughts, at times  She makes a Verbal Safety Contract, for the duration of Therapy at this [facility] facility  Her thoughts are somewhat disorganized,and she needs some direction to get back on-track  She has occasional memory lapses  She has low concentration  Her insight and judgment are limited  Pt and  identify and process their many stressors, especially 's Cancer, daughter's Cancer and her out-of control, allegedly-Bipolar behavior (but could have another Organic component related to her Brain Cancer), Pt's Bipolar Disorder plus her Pain and her decreased Vision in one eye now, and their modest Finances, and their lack of planning for the future, or for  plans for anyone in the family, etc--->this is something that pt  mentions a few times, this session   Pt and  process Mortality issues, today  Active listening, Support,and Validation, given in this Evaluation Psychotherapy session  We also review some of their strengths  We review Calm/ Deep-breathing/ relaxation exercises,and pt and  to do at home  Some Psychoeducation is given, re: the relationship of these multiple stressors on their Physical and Mental Health   acknowledges that he also already had one Heart Attack years back, but he did return to work fairly quickly after same  This Therapist also validates their ideas re: doing some Pre-planning , re: family desires/ expectations/ finances re:  Arrangements, as this will take some of the burden off of surviving family members in the future, and pt says it would definitely give her some peace of mind  They will talk about this more at home, and they may consult a [facility] whom they trust    A: Bipolar Disorder, F31 9; Anxiety Disorder, F41 9; Marital issues and past discord, Z63 0; Anticipatory Grieving; and Multiple Medical problems (see pt's documents in [employer]  )  P: Continue Medications as prescribed by Dr Elle Wynn and by Physician Assistant Renae Yostping continue Individual Psychotherapy, and Family Therapy prn  Pain Scale and Suicide Risk [facility]: Current Pain Assessment: moderate to severe   On a scale of 0 to 10, the patient rates current pain at 8   Current suicide risk is low   Behavioral Health Treatment Plan [employer]: Diagnosis and Treatment Plan explained to patient, patient relates understanding diagnosis and is agreeable to Treatment Plan  Assessment    1  Bipolar affective disorder, current episode severe (296 80) (F31 9)   2  Anxiety disorder (300 00) (F41 9)   3  Anticipatory grieving (309 0) (F43 20)   4  Chronic pain (338 29) (G89 29)   5   Marital conflict (C07 57) (J90 7)    Signatures   Electronically signed by : Angelita Cooper MSAPRNPMHCNS-BC; Mar 30 2017 10:37PM EST                       (Author) Electronically signed by : Jared Lawrence MSAPRNPMHCNS-BC; Mar 30 2017 10:37PM EST                       (Author)    Electronically signed by : Edouard Menendez MD; Apr  3 2017  2:26PM EST

## 2018-01-16 NOTE — PSYCH
1  Bipolar affective disorder, current episode severe (296 80) (F31 9)   2  Anxiety disorder (300 00) (F41 9)      Date of Initial Treatment Plan: 6/1/17  Date of Current Treatment Plan: 6 /1/17  Treatment Plan 1  Strengths/Personal Resources for Self Care: I am trying to take care of my Physical and Mental Health  I have been  50 years (in July),and I raised 2 children,and I am emotionally supportive of my ,and of my adult children  Diagnosis:   Axis I: Bipolar Disorder, severe, F31 9; Anxiety Disorder, F41 9   Axis II: deferred  Axis III: Diabetes Mellitus type II; HTN; Arthritis, Chronic Pain,and others  Area of Needs: I want to be assertive with my adult Bipolar daughter,and use my Positive Coping skills  Long Term Goals:   I will have increased Assertiveness with my daughter, have more Hope for my future, and less anxiety , less depression  Target Date: 10/ 1 /17  Short Term Objectives:   Goal 1:   I participate in Psychotherapy, and sometimes have family sessions  I take meds as prescribed by Dr Lennon Skiff and by Rosanna Aragon  I am setting limits on my daughter when she is disrespectful---  I am being more Assertive  My  and I are trying to get our daughter placed in a [address] [place] [address] setting, with assistance from her Psychiatrist at [facility]  I use positive Coping Skills, such as: Deep-breathing/ Relaxation exercises; I go outside on the porch for a while; I use Anger-Management skills; I do Cognitive-reframing, focussing on my Survivor strengths and abilities  I am getting my Pain treated by Specialists  I also am on a Healthy Diet,and have lost 8 lbs in the past 2 weeks  I also have some Social Time with my  and friends---we went out to Music/ listened to a Band; and we are also planning a vacation in October, to [place]   I also enjoy Cooking, trying new recipes, for my Family, and I have Support for my Healthy Diet and recipes, from Facebook friends  Target Date: 10/ 1 /17              GOAL 1: Modality: Individual 2 to 3 x per month Target Date: 10/ 1/17  GOAL 1: Modality: Group Pain group 1 x per month   GOAL 1: Modality: Couples prn x per month   GOAL 1: Modality: Family prn x per month   GOAL 1: Modality: Medication Management 1 x per month Target Date: Ongoing  The person(s) responsible for carrying out the plan is Client: Josephine Rushing; Therapist: Kacy Live; and Dr Laverna Kehr office  The first scheduled review date is 10 /1 /17       The expected length of service is 3 to 4 more months       Level of functioning at initial assessment: 50  The highest level of functioning in the past year was Unknown       The current level of functioning is 50  CLIENT COMMENTS / Please share your thoughts, feelings, need and/or experiences regarding your treatment plan: _____________________________________________________________________________________________________________________________________________________________________________________________________________________________________________________________________________________________________________________ Date/Time: ______________     Patient Signature: _________________________________ Date/Time: ______________        1  Anticipatory grieving (309 0) (F43 20)   2  Anxiety disorder (300 00) (F41 9)   3  Bipolar affective disorder, current episode severe (296 80) (F31 9)   4  Chronic pain (338 29) (G89 29)   5   Marital conflict (O30 16) (G05 0)     Electronically signed by : Hilarie Duverney, MSAPRNPMHCNS-BC; Jun 1 2017  5:16PM EST                       (Author)

## 2018-01-17 NOTE — PSYCH
Psych Med Mgmt    Appearance: was calm and cooperative  Observed mood: anxious  Observed mood: affect was tearful  Speech: a normal rate  Thought processes: coherent/organized  Hallucinations: no hallucinations present  Abnormal Thoughts: The patient has no suicidal thoughts and no homicidal thoughts  Orientation: The patient is oriented to person, place and time  Recent and Remote Memory: short term memory intact and long term memory intact  Attention Span And Concentration: concentration intact  Insight: Insight intact  Judgment: Her judgment was intact  Muscle Strength And Tone  Normal gait and station  Individual Psychotherapy minutes and Family Therapy provided today  Goals addressed in session: Marital issues  Handling stressors  Anxiety reduction techniques       Treatment Recommendations: Abilify 5 mg po qhs- increase to 10 mg   Depakote 500 mg po bid  Check level ASAP  Prn Hydroxyzine- never took  Trazodone 150 mg po qhs  Given number for [place] therapists  Risks, Benefits And Possible Side Effects Of Medications: Risks, benefits, and possible side effects of medications explained to patient and patient verbalizes understanding  She reports normal appetite, normal energy level, no weight change and normal number of sleep hours  Pt seen accompanied by spouse  Apparently he was diagnosed with prostate and bone cancer  She is upset because she wants her spouse to retire and spend time with her  She also has stressors with her daughter who has moved back in and she has brain cancer- she is uncertain how long her daughter is staying there  She feels as though she has to watch every word she says and foes not feel comfortable in her own home  She feels as though her spouse does not want to spend time with her, etc   She is sleeping and eating adequately  No side effects with meds- medically she is doing okay  DSM    Provisional Diagnosis: Bipolar Disorder  Assessment    1  Bipolar affective disorder, current episode severe (296 80) (F31 9)    Plan    1  ARIPiprazole 10 MG Oral Tablet; 1 po qhs   2  HydrOXYzine HCl - 50 MG Oral Tablet; 1 po q 8 hours anxiety   3  TraZODone HCl - 100 MG Oral Tablet; 1 and 1/2 po qhs   4  (Q) VALPROIC ACID; Status:Active; Requested for:02Vjo2254;     Review of Systems    Constitutional: No fever, no chills, feels well, no tiredness, no recent weight gain or loss  Cardiovascular: no complaints of slow or fast heart rate, no chest pain, no palpitations  Respiratory: no complaints of shortness of breath, no wheezing, no dyspnea on exertion  Gastrointestinal: no complaints of abdominal pain, no constipation, no nausea, no diarrhea, no vomiting  Genitourinary: no complaints of dysuria, no incontinence, no pelvic pain, no urinary frequency  Musculoskeletal: no complaints of arthralgia, no myalgias, no limb pain, no joint stiffness  Integumentary: no complaints of skin rash, no itching, no dry skin  Neurological: no complaints of headache, no confusion, no numbness, no dizziness  Active Problems    1  Bipolar affective disorder, current episode severe (296 80) (F31 9)    Allergies    1  Percocet TABS   2  Sulfa Drugs    Current Meds   1  ARIPiprazole 5 MG Oral Tablet; 1 po qhs;   Therapy: 62SYW3659 to (Last Rx:14Sep2016)  Requested for: 14Sep2016 Ordered   2  Cozaar 50 MG Oral Tablet; Therapy: (Recorded:23Kww3265) to Recorded   3  Divalproex Sodium 500 MG Oral Tablet Delayed Release; 1 tab po bid  Requested for:   14Sep2016; Last Rx:61Gpy9763 Ordered   4  HydrOXYzine HCl - 50 MG Oral Tablet; 1 po q 8 hours anxiety; Therapy: 15Ysx4558 to (Last Rx:14Oph3503)  Requested for: 91Prr1298 Ordered   5  Lantus SOLN;   Therapy: (Recorded:09Nov2015) to Recorded   6  MetFORMIN HCl TABS; Therapy: [phone]) to Recorded   7   TraZODone HCl - 100 MG Oral Tablet; 1 and 1/2 po qhs;   Therapy: 24Hji9410 to (Last Rx:45Ttt7547) Requested for: 01Qoc5546 Ordered    Family Psych History  Mother    1  No pertinent family history  Family History    2  Adopted (Z55 97) (Z02 82)    The family history was reviewed and updated today  Social History    · Never a smoker   · Social alcohol use (Z78 9)  The social history was reviewed and is unchanged  End of Encounter Meds    1  ARIPiprazole 10 MG Oral Tablet; 1 po qhs;   Therapy: 37DFN4706 to (Last Rx:45Qcn7594)  Requested for: 09Udr3542 Ordered   2  Divalproex Sodium 500 MG Oral Tablet Delayed Release (Depakote); 1 tab po bid    Requested for: 99Dmx8133; Last Rx:30Hmv5256 Ordered   3  HydrOXYzine HCl - 50 MG Oral Tablet; 1 po q 8 hours anxiety; Therapy: 31Ign9587 to (Last Rx:51Uhx8866)  Requested for: 60Inz1844 Ordered   4  TraZODone HCl - 100 MG Oral Tablet; 1 and 1/2 po qhs;   Therapy: 28Ldy6751 to (Last Rx:22Feb2017)  Requested for: 02Amv1580 Ordered    5  Cozaar 50 MG Oral Tablet (Losartan Potassium); Therapy: (Recorded:36Lva0879) to Recorded   6  Lantus SOLN;   Therapy: (Recorded:52Gtv5116) to Recorded   7  MetFORMIN HCl TABS;    Therapy: (75 [phone]) to Recorded    Signatures   Electronically signed by : Jeffery Carcamo, [facility]; Feb 22 2017 12:22PM EST                       (Author)
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
## 2018-01-17 NOTE — PSYCH
Progress Note  Psychotherapy Provided [facility]: Individual Psychotherapy 50 minutes provided today  Goals addressed in session:   Goal #1  D: " Today,and many days , I feel restless, like I could jump out of my skin, I can't stay still"   "I take my Abilify 10 mg at night  Stephany Abdi Elvinmagdaleno Trinidad I can't stay asleep, I"m awake by 2:30 am, even though I've had my sleeping medication,and then i just get up and watch TV a while, so I'm tired in the day "   " My Depakote I've been taking for years , so that's not a problem  "  Stephany Abdi     " I still want my daughter to move out, to a supervised Group home or setting  Elvinmagdaleno Trinidad Abdi I'll give this request of mine, to her Psychiatrist and her Therapist "   " My  is trying to cope with his Cancer and his chemo---he does rest on the bed, a lot, but he also drives me everywhere,and he helps me plan some positive things---like, My Birthday party at our [place], in July! I love to cook,and I love 55's-59's -66's music, so we're having a DJ friend do the music at our party "   " I'll push through my Pain, but my pain is a 7 today, in my Back and in my Left leg "  Stephany Abdi Pt has an anxious,restless affect  Pt may be experiencing some akathesia, r/t her Abilify   pt to give information about her restlessness and symptoms and sleeplessness, to her Psychiatrist---may need an antidote, or an adjustment in her med  Pt denies any SI [address]  Pt processes her thoughts, feelings,and behaviors  Pt is going to assertively give info to her 45 yr old daughter's Psych  providers, re: daughter needs placement in another setting besides pt's home  Pt also processes her feelings about her 's cancer---pt is supportive of him, andshe engages him in some positive activities, such as "date nights', or her 67th Birthday party in July  Active listening and support given in session  Pt does cognitive-reframing, focussing on some positive thoughts about herself, and some of her blessings and positives in her life   Pt also to advocate for herself, re: Orthopedic lor'ts/ or Pain Management ---she may go somewhere where the appointment will be BEFORE the August date she has been given by one Practice  A: Bipolar Disorder, F31 9; Anxiety Disorder, F41 9; Chronic Pain,and other medical conditions  P: Continue Treatment Plan, Meds,and Psychotherapy  Pain Scale and Suicide Risk [facility]: Current Pain Assessment: moderate to severe   On a scale of 0 to 10, the patient rates current pain at 7   Current suicide risk is low   Behavioral Health Treatment Plan [facility]: Diagnosis and Treatment Plan explained to patient, patient relates understanding diagnosis and is agreeable to Treatment Plan  Assessment    1  Bipolar affective disorder, current episode severe (296 80) (F31 9)   2  Anxiety disorder (300 00) (F41 9)   3   Chronic pain (338 29) (G89 29)    Signatures   Electronically signed by : Samanta Millan MSAPRNPMHCNS-BC; Jun 8 2017  5:25PM EST                       (Author)

## 2018-01-17 NOTE — PSYCH
Treatment Plan Tracking    #1 Treatment Plan not completed within required time limits due to: Client presented with emotional/behavioral issues that required clinical intervention  , Other: Pt is very tearful re: current events,and Re: She's wondering what her Birth Parents were like, if they had Bipolar or other medical issues, or legal issues?, as she was Adopted  Processing Family of Origin issues             Signatures   Electronically signed by : Fran Quintanilla MSAPRNPMHCNS-BC; Oct  5 2017  3:34PM EST                       (Author)

## 2018-01-22 VITALS — RESPIRATION RATE: 16 BRPM | BODY MASS INDEX: 31.41 KG/M2 | HEIGHT: 64 IN | WEIGHT: 184 LBS

## 2018-01-23 NOTE — PSYCH
Progress Note  Psychotherapy Provided [facility]: Individual Psychotherapy 50 minutes provided today  Goals addressed in session:   Goal #1  D: "I'm facing something very serious--I may have Parkinson's "   " I can't stop shaking,and these tremors affect my whole life right now "  Zachary Piñamartha Hardycharmaine "My daughter, who has a brain tumor and she has Bipolar, says some of the most insensitive things to me--such as , "Do you have to keep shaking, while we're sitting on the couch??"   " My doctors are doing tests, so I should have some answers soon   " " Having Parkinson's would be very sad, for me"   " but I will deal with it, if I have to   "   " I also have decided to do a goal I've had for a long time---I'm going to find out who my biological parents and siblings are,and find out what illnesses they had or have"   " I have to do it , for myself,and my  said he would help me   "   Pt has an anxious and moderately depressed affect and mood  Denies current SI/HI/AH/Vh  Her PHQ9=9, mild depression  She has multiple medical issues, including R/O ? Parkinson's  her Pain = 5 today, in her Left Hip and left leg  Pt processes her thoughts,feelings,and behaviors  She processes some anticipatory grief, re:?possibly having Parkinson's  Active Listening, Support,and validation given  Pt is assisted with cognitive -reframing, re: she aways looks for a new small goal in her life, to accomplish  She did her [place] trip, with family---and she enjoyed part of it, except for her pain  She has decided to do whatever Tx's are available, for her conditions  She also is finding, via internet and [facility], who her biological parents were/ are,as she says she has wanted to know since childhood  Her  is helping with this  Pt to also speak directly to her 45 yr old daughter, re: pt's nneds for empathy and support,and NOT have the negativity coming her way  Pt's positive coping activiites include:  Facebook, Cooking, [employer], Deep-breathing exercises,and prayer  She continues meds as Rx'd by Physicians  A: Bipolar disorder, F31 9; Anxiety Disorder, F41 9; family issues; Chronic medical conditions  P: Continue Treatment Plan, Meds, and Psychotherapy  Pain Scale and Suicide Risk  [facility]: Current Pain Assessment: moderate to severe   On a scale of 0 to 10, the patient rates current pain at 5   Current suicide risk is low   Behavioral Health Treatment Plan [facility]: Diagnosis and Treatment Plan explained to patient, patient relates understanding diagnosis and is agreeable to Treatment Plan  Results/Data  PHQ-9 Adult Depression Screening [id number] 02:10PM Any Gomez     Test Name Result Flag Reference   PHQ-9 Adult Depression Score 9     Over the last two weeks, how often have you been bothered by any of the following problems? Little interest or pleasure in doing things: Not at all - 0  Feeling down, depressed, or hopeless: Several days - 1  Trouble falling or staying asleep, or sleeping too much: Several days - 1  Feeling tired or having little energy: Several days - 1  Poor appetite or over eating: Several days - 1  Feeling bad about yourself - or that you are a failure or have let yourself or your family down: Several days - 1  Trouble concentrating on things, such as reading the newspaper or watching television: Not at all - 0  Moving or speaking so slowly that other people could have noticed  Or the opposite -  being so fidgety or restless that you have been moving around a lot more than usual: Nearly every day - 3  Thoughts that you would be better off dead, or of hurting yourself in some way: Several days - 1   PHQ-9 Adult Depression Screening Negative     PHQ-9 Difficulty Level Somewhat difficult     PHQ-9 Severity Mild Depression         Assessment    1  Bipolar 1 disorder (296 7) (F31 9)   2  ORION (generalized anxiety disorder) (300 02) (F41 1)   3   Family discord (V61 9) (Z63 8)    Signatures   Electronically signed by Jenna Khalil, GENNAROPRNPMHCNS-BC; Chava 10 2018  2:30PM EST                       (Author)    Electronically signed by : BASHIR SanchezGreenwood Lefl[facility] Hospital[facility]-BC; Chava 10 2018  2:31PM EST                       (Author)

## 2018-01-25 ENCOUNTER — OFFICE VISIT (OUTPATIENT)
Dept: BEHAVIORAL/MENTAL HEALTH CLINIC | Facility: CLINIC | Age: 68
End: 2018-01-25
Payer: COMMERCIAL

## 2018-01-25 DIAGNOSIS — F31.63 BIPOLAR DISORDER, CURR EPISODE MIXED, SEVERE, W/O PSYCHOTIC FEATURES (HCC): ICD-10-CM

## 2018-01-25 DIAGNOSIS — F31.63 BIPOLAR DISORDER, CURRENT EPISODE MIXED, SEVERE, WITHOUT PSYCHOTIC FEATURES (HCC): Primary | ICD-10-CM

## 2018-01-25 DIAGNOSIS — F41.1 GAD (GENERALIZED ANXIETY DISORDER): ICD-10-CM

## 2018-01-25 PROBLEM — G44.209 TENSION TYPE HEADACHE: Status: ACTIVE | Noted: 2017-02-14

## 2018-01-25 PROBLEM — G89.29 CHRONIC PAIN: Status: ACTIVE | Noted: 2017-03-30

## 2018-01-25 PROBLEM — F43.20 ANTICIPATORY GRIEVING: Status: ACTIVE | Noted: 2017-03-30

## 2018-01-25 PROBLEM — F31.9 BIPOLAR 1 DISORDER (HCC): Status: ACTIVE | Noted: 2017-09-07

## 2018-01-25 PROBLEM — M79.10 MYALGIA: Status: ACTIVE | Noted: 2017-11-15

## 2018-01-25 PROCEDURE — 90834 PSYTX W PT 45 MINUTES: CPT | Performed by: REGISTERED NURSE
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
## 2018-01-25 NOTE — PATIENT INSTRUCTIONS
Generalized Anxiety Disorder   WHAT YOU NEED TO KNOW:   General anxiety disorder (ORION) is a condition that causes you to worry more than normal  It also causes you to feel fear in most situations  You are worried or afraid even without a cause  You may worry about your health, job, money, and relationships  It is hard for you to control your worry and feel calm  ORION prevents you from doing daily activities  It may also prevent you from spending time with family and friends  Without treatment, your anxiety may get worse  DISCHARGE INSTRUCTIONS:   Call 911 for any of the following:   · You have chest pain, tightness, or heaviness that may spread to your shoulders, arms, jaw, neck, or back  · You feel like hurting yourself or someone else  Contact your healthcare provider if:   · Your symptoms get worse or do not get better with treatment  · You have new symptoms since your last visit  · You have questions or concerns about your condition or care  Medicines:   · Medicines  help you feel more calm and relaxed, and decrease your symptoms  · Take your medicine as directed  Contact your healthcare provider if you think your medicine is not helping or if you have side effects  Tell him of her if you are allergic to any medicine  Keep a list of the medicines, vitamins, and herbs you take  Include the amounts, and when and why you take them  Bring the list or the pill bottles to follow-up visits  Carry your medicine list with you in case of an emergency  Manage anxiety:   · Talk to someone about your anxiety  Your healthcare provider may suggest counseling  Cognitive behavioral therapy can help you understand and change how you react to events  It can also help you understand what triggers your symptoms  You might feel more comfortable talking with a friend or family member about your anxiety  Choose someone you know will be supportive and encouraging  · Keep a journal of your symptoms    Write down what you were doing before your symptoms started  Also write down what made the anxiety better or worse  Bring this journal with you to your follow-up appointments  · Find ways to relax  Activities such as yoga, meditation, or listening to music can help you relax  Spend time with friends, or do things you enjoy  · Practice deep breathing  Deep breathing can help you relax when you feel anxious  Focus on taking slow, deep breaths several times a day, or during an anxiety attack  Slowly breathe in through your nose  Pause, then slowly breathe out through your mouth  Try to breathe out longer than you breathed in  · Create a regular sleep routine  Regular sleep can help you feel calmer during the day  Go to sleep and wake up at the same times every day  Do not watch television or use the computer right before bed  Your room should be comfortable, dark, and quiet  · Eat a variety of healthy foods  Healthy foods include fruits, vegetables, low-fat dairy products, lean meats, fish, whole-grain breads, and cooked beans  Healthy foods can help you feel less anxious and have more energy  · Exercise regularly  Exercise can increase your energy level  Exercise may also lift your mood and help you sleep better  Your healthcare provider can help you create an exercise plan  · Do not smoke  Nicotine and other chemicals in cigarettes and cigars can increase anxiety  Ask your healthcare provider for information if you currently smoke and need help to quit  E-cigarettes or smokeless tobacco still contain nicotine  Talk to your healthcare provider before you use these products  · Do not have caffeine  Caffeine can make your symptoms worse  Do not have foods or drinks that are meant to increase your energy level  · Limit or do not drink alcohol  Ask your healthcare provider if alcohol is safe for you  Also ask how much is safe   You may not be able to drink alcohol if you take certain anxiety or depression medicines  · Do not use drugs  Drugs can make your anxiety worse  It can also make anxiety hard to manage  Talk to your healthcare provider if you use drugs and want help to quit  Follow up with your healthcare provider as directed:  Write down your questions so you remember to ask them during your visits  © 2017 [address] Information is for End User's use only and may not be sold, redistributed or otherwise used for commercial purposes  All illustrations and images included in CareNotes® are the copyrighted property of A D [employer] M , [employer]  or [address]  The above information is an  only  It is not intended as medical advice for individual conditions or treatments  Talk to your doctor, nurse or pharmacist before following any medical regimen to see if it is safe and effective for you  Bipolar Disorder   WHAT YOU NEED TO KNOW:   Bipolar disorder is a long-term chemical imbalance that causes rapid changes in mood and behavior  High moods are called isidro  Low moods are called depression  Sometimes you will feel manic and sometimes you will feel depressed  You can have alternating episodes of isidro and depression  This is called a mixed bipolar state  DISCHARGE INSTRUCTIONS:   Contact your healthcare provider or psychiatrist if:   · You are having trouble managing your bipolar disorder  · You cannot sleep, or are sleeping all the time  · You cannot eat, or are eating more than usual     · You feel dizzy or your stomach is upset  · You cannot make it to your next meeting with your healthcare provider  · You have questions or concerns about your condition or care  Medicines:   · Medicines  may be given to help keep your mood stable, or to help you sleep  Changes in medicine are often needed as your bipolar disorder changes  · Take your medicine as directed    Contact your healthcare provider if you think your medicine is not helping or if you have side effects  Tell him or her if you are allergic to any medicine  Keep a list of the medicines, vitamins, and herbs you take  Include the amounts, and when and why you take them  Bring the list or the pill bottles to follow-up visits  Carry your medicine list with you in case of an emergency  Follow up with your healthcare provider or psychiatrist as directed: You may need to return for blood tests to monitor the levels of bipolar medicine in your blood  Manage bipolar disorder:  Watch for triggers of bipolar disorder symptoms, such as stress  Learn new ways to relax, such as deep breathing, to manage your stress  Tell someone if you feel a manic or depressive period might be coming on  Ask a friend or family member to help watch you for bipolar symptoms  Work to develop skills that will help you manage bipolar disorder  You may need to make lifestyle changes  Ask your healthcare provider or psychiatrist for resources  For support and more information:   · [address] [facility], [address] [facility], [address], [address]  [place] [address]   Phone: [phone]  Phone: [phone]  Web Address: [facility]  · Depression and [address] ([facility])  [address] [place] [address], [address] [place] [address] , [address] [facility]  Phone: [phone]  Web Address: [employer]  org   © 2017 [address] [facility] Information is for End User's use only and may not be sold, redistributed or otherwise used for commercial purposes  All illustrations and images included in CareNotes® are the copyrighted property of [employer] M [employer]  or Jorge Baxter  The above information is an  only  It is not intended as medical advice for individual conditions or treatments   Talk to your doctor, nurse or pharmacist before following any medical regimen to see if it is safe and effective for you

## 2018-02-06 RX ORDER — ARIPIPRAZOLE 10 MG/1
TABLET ORAL
Qty: 90 TABLET | Refills: 1 | OUTPATIENT
Start: 2018-02-06

## 2018-02-13 ENCOUNTER — TELEPHONE (OUTPATIENT)
Dept: BEHAVIORAL/MENTAL HEALTH CLINIC | Facility: CLINIC | Age: 68
End: 2018-02-13

## 2018-02-13 NOTE — TELEPHONE ENCOUNTER
----- Message from Mariella Washburn sent at 2/9/2018  2:07 PM EST -----  Pt called stating that she really would like to talk to you if you can give her a call back

## 2018-02-15 ENCOUNTER — OFFICE VISIT (OUTPATIENT)
Dept: BEHAVIORAL/MENTAL HEALTH CLINIC | Facility: CLINIC | Age: 68
End: 2018-02-15
Payer: COMMERCIAL

## 2018-02-15 ENCOUNTER — TELEPHONE (OUTPATIENT)
Dept: BEHAVIORAL/MENTAL HEALTH CLINIC | Facility: CLINIC | Age: 68
End: 2018-02-15

## 2018-02-15 DIAGNOSIS — F31.32 BIPOLAR I DISORDER, MOST RECENT EPISODE DEPRESSED, MODERATE (HCC): Primary | ICD-10-CM

## 2018-02-15 DIAGNOSIS — F41.1 GENERALIZED ANXIETY DISORDER: ICD-10-CM

## 2018-02-15 PROCEDURE — 90853 GROUP PSYCHOTHERAPY: CPT | Performed by: REGISTERED NURSE

## 2018-02-15 NOTE — PATIENT INSTRUCTIONS
Bipolar Disorder   AMBULATORY CARE:   Bipolar disorder  is a long-term chemical imbalance that causes rapid changes in mood and behavior  High moods are called isidro  Low moods are called depression  Sometimes you will feel manic and sometimes you will feel depressed  You can have alternating episodes of isidro and depression  This is called a mixed bipolar state  Call 911 if:   · You think about hurting yourself or someone else  Contact your healthcare provider or psychiatrist if:   · You are having trouble managing your bipolar disorder  · You cannot sleep, or are sleeping all the time  · You cannot eat, or are eating more than usual     · You feel dizzy or your stomach is upset  · You cannot make it to your next meeting  · You have questions or concerns about your condition or care  Common signs and symptoms of isidro:   · Being easily distracted or agitated, or focusing all your attention on a goal    · Insomnia (trouble sleeping) or not needing as much sleep as usual    · Inflated self-esteem or belief in abilities    · Racing thoughts that may not make sense or be understood by others    · Speech that is faster than usual, or you talk more than usual    · Increased thoughts about sex    · Happy and care free, with a sudden change to anger or irritability    · Hallucinations that cause you to see and hear things that are not really there  Common signs and symptoms of depression:   · Anger, worry, anxiousness, or irritability    · Lack of energy    · Sadness or emptiness    · Crying for long periods    · Low self-esteem or sense of worthlessness    · Negative thoughts or not caring about anything    · Too much or too little sleep  Treatment  for bipolar disorder may include medicines to control your mood swings  You may need to see a therapist or psychiatrist regularly for counseling  You may need to go into the hospital for tests and treatment    Follow up with your healthcare provider or psychiatrist as directed:  Write down your questions so you remember to ask them during your visits  Manage bipolar disorder:  Watch for triggers of bipolar disorder symptoms, such as stress  Learn new ways to relax, such as deep breathing, to manage your stress  Tell someone if you feel a manic or depressive period might be coming on  Ask a friend or family member to help watch you for bipolar symptoms  Work to develop skills that will help you manage bipolar disorder  You may need to make lifestyle changes  Ask your healthcare provider or psychiatrist for resources  © 2017 [address]  Information is for End User's use only and may not be sold, redistributed or otherwise used for commercial purposes  All illustrations and images included in CareNotes® are the copyrighted property of [employer] M [employer]  or Jorge Baxter  The above information is an  only  It is not intended as medical advice for individual conditions or treatments  Talk to your doctor, nurse or pharmacist before following any medical regimen to see if it is safe and effective for you

## 2018-02-15 NOTE — PSYCH
Group Therapy: Bipolar Wellness Group    D: Pt Zoila Ortiz) participated in [address] [place] [address] today, 2 to 3 pm  Topics explored included: Introductions; Confidentiality; Each Group member's Sharing of how long they have been Coping with Bipolar,and current stressors and Mood patterns (Depression, Aiyana, or Hypomania, or a combination); Treatments and meds which have helped them; Psychoeducation provided by this Therapist re: medication updates; [place] Support and Sharing among Group members; and the final segment of Group today was comprised of Deep-Breathing/ Relaxation/ Guided Imagery exercises, accompanied by Verlee Blaze /music via Cd  Rebecca Aguilar participated actively in Group  She described that she had experienced mixed moods, but more depression lately , directly related to her 40 yr old daughter's volatile and verbally abusive behavior  Pt and  called the police---> daughter Agueda [place]) went to [facility] for one day, apparently,and then was released  Pt and  both are physically ill,  with Stage 4 cancer, and pt valiantly trying to use her Coping skills to deal with Bipolar, Severe Anxiety, and with her Chronic Pain,and her R/O Parkinson's tests/ symptoms in progress  Pt and  to have a Family meeting with daughter's / Therapist next week  Pt was given support by a peer and by this Therapist, in Group today  Pt to be assertive at the [facility],THE will plead with daughter's  to have daughter placed elsewhere, outside of their home  Pt was thankful for Group today  She was more relaxed at the end of the Deep-Breathing/ Relaxation/ Guided-imagery segment  She had no current SI/HI/AH/VH today  Continues meds  A:  Bipolar disorder, mixed, F31 62; Generalized Anxiety Disorder, F41 10; and multiple medical conditions,and Family strife with daughter; pt appears to benefit from Group  P: Continue Treatment Plan, meds, Individual and Group Therapy  Pain = 4 today    Risk of harm: Low

## 2018-03-08 ENCOUNTER — TELEPHONE (OUTPATIENT)
Dept: BEHAVIORAL/MENTAL HEALTH CLINIC | Facility: CLINIC | Age: 68
End: 2018-03-08

## 2018-03-11 DIAGNOSIS — F41.9 ANXIETY: Primary | ICD-10-CM

## 2018-03-14 RX ORDER — GABAPENTIN 100 MG/1
CAPSULE ORAL
Qty: 180 CAPSULE | Refills: 0 | Status: SHIPPED | OUTPATIENT
Start: 2018-03-14

## 2018-05-16 ENCOUNTER — TELEPHONE (OUTPATIENT)
Dept: BEHAVIORAL/MENTAL HEALTH CLINIC | Facility: CLINIC | Age: 68
End: 2018-05-16